# Patient Record
Sex: FEMALE | Race: WHITE
[De-identification: names, ages, dates, MRNs, and addresses within clinical notes are randomized per-mention and may not be internally consistent; named-entity substitution may affect disease eponyms.]

---

## 2017-01-11 ENCOUNTER — HOSPITAL ENCOUNTER (OUTPATIENT)
Dept: HOSPITAL 47 - MMGSC | Age: 27
Discharge: HOME | End: 2017-01-11
Payer: COMMERCIAL

## 2017-01-11 DIAGNOSIS — J02.9: Primary | ICD-10-CM

## 2017-01-11 PROCEDURE — 87070 CULTURE OTHR SPECIMN AEROBIC: CPT

## 2017-05-22 ENCOUNTER — HOSPITAL ENCOUNTER (OUTPATIENT)
Dept: HOSPITAL 47 - MMGSC | Age: 27
End: 2017-05-22
Payer: COMMERCIAL

## 2017-05-22 DIAGNOSIS — J02.9: Primary | ICD-10-CM

## 2017-05-22 PROCEDURE — 87070 CULTURE OTHR SPECIMN AEROBIC: CPT

## 2018-01-05 ENCOUNTER — HOSPITAL ENCOUNTER (OUTPATIENT)
Dept: HOSPITAL 47 - LABPAT | Age: 28
Discharge: HOME | End: 2018-01-05
Attending: OTOLARYNGOLOGY
Payer: COMMERCIAL

## 2018-01-05 DIAGNOSIS — J35.01: ICD-10-CM

## 2018-01-05 DIAGNOSIS — Z01.812: Primary | ICD-10-CM

## 2018-01-05 LAB
APTT BLD: 25.8 SEC (ref 22–30)
ERYTHROCYTE [DISTWIDTH] IN BLOOD BY AUTOMATED COUNT: 5.06 M/UL (ref 3.8–5.4)
ERYTHROCYTE [DISTWIDTH] IN BLOOD: 12.4 % (ref 11.5–15.5)
HCT VFR BLD AUTO: 43.8 % (ref 34–46)
HGB BLD-MCNC: 14.3 GM/DL (ref 11.4–16)
INR PPP: 1 (ref ?–1.2)
MCH RBC QN AUTO: 28.3 PG (ref 25–35)
MCHC RBC AUTO-ENTMCNC: 32.7 G/DL (ref 31–37)
MCV RBC AUTO: 86.6 FL (ref 80–100)
PLATELET # BLD AUTO: 309 K/UL (ref 150–450)
PT BLD: 10.3 SEC (ref 9–12)
WBC # BLD AUTO: 9.5 K/UL (ref 3.8–10.6)

## 2018-01-05 PROCEDURE — 85027 COMPLETE CBC AUTOMATED: CPT

## 2018-01-05 PROCEDURE — 85610 PROTHROMBIN TIME: CPT

## 2018-01-05 PROCEDURE — 85730 THROMBOPLASTIN TIME PARTIAL: CPT

## 2018-01-05 PROCEDURE — 36415 COLL VENOUS BLD VENIPUNCTURE: CPT

## 2018-01-12 ENCOUNTER — HOSPITAL ENCOUNTER (OUTPATIENT)
Dept: HOSPITAL 47 - OR | Age: 28
Discharge: HOME | End: 2018-01-12
Attending: OTOLARYNGOLOGY
Payer: COMMERCIAL

## 2018-01-12 VITALS — HEART RATE: 70 BPM | DIASTOLIC BLOOD PRESSURE: 70 MMHG | SYSTOLIC BLOOD PRESSURE: 119 MMHG

## 2018-01-12 VITALS — BODY MASS INDEX: 47.9 KG/M2

## 2018-01-12 VITALS — TEMPERATURE: 97.2 F

## 2018-01-12 VITALS — RESPIRATION RATE: 16 BRPM

## 2018-01-12 DIAGNOSIS — Z79.899: ICD-10-CM

## 2018-01-12 DIAGNOSIS — J35.01: Primary | ICD-10-CM

## 2018-01-12 DIAGNOSIS — J45.909: ICD-10-CM

## 2018-01-12 PROCEDURE — 88304 TISSUE EXAM BY PATHOLOGIST: CPT

## 2018-01-12 PROCEDURE — 42826 REMOVAL OF TONSILS: CPT

## 2018-01-12 PROCEDURE — 81025 URINE PREGNANCY TEST: CPT

## 2018-01-12 NOTE — HP
HISTORY AND PHYSICAL



CHIEF COMPLAINT:

Recurrent tonsillitis.



HISTORY OF PRESENT ILLNESS:

The patient is a pleasant 27-year-old female who was recently seen in my office with

complaints of having recurrent episodes of tonsillitis despite treatment with various

types of oral antibiotics.  At the time that the patient was seen in my office,

clinical examination of the oropharynx revealed 4+ cryptic tonsils filled with white

cheesy debris.  It was recommended that the patient undergo a tonsillectomy under

general anesthesia.



PAST MEDICAL HISTORY:

Past medical history reveals patient has no known allergies to medications.  She has

not had any previous surgeries.  She is not currently on any medications.  There is no

history of asthma, diabetes mellitus or hypertension.



REVIEW OF SYSTEMS:

Completely unremarkable.



PHYSICAL EXAMINATION:

This patient is a pleasant 27-year-old female who is alert and cooperative.

HEENT EXAMINATION: The patient is normocephalic. Tympanic membranes are normal.  Middle

ear spaces are free of any fluid or infection.  Pupils are equal, round and react to

light and accommodation.  Extraocular movements within normal limits.  Intranasal

examination reveals moderate septal deviation with compensatory hypertrophy of the

inferior turbinates and a moderate amount of mucus on the mucous membranes and draining

down the posterior pharynx.  Examination of oropharynx reveals 4+ tonsillar hypertrophy

with very prominent tonsillar crypts filled with white cheesy debris.  Palpation of the

neck, cranial nerves 2 to 12 and the remainder of the head and neck exam are within

normal limits.

CHEST/CARDIOVASCULAR: Both lung fields are clear to percussion and auscultation.  The

patient is in regular sinus rhythm. S1, S2 are present without evidence of any murmurs,

S3s or S4s.  Peripheral pulses are bilaterally symmetrical and within normal limits.

ABDOMEN: There is no evidence of any masses, megaly, or tenderness.  The abdomen is

soft.

Skin is unremarkable.

Musculoskeletal and neurological are within normal limits.



The remainder of physical exam is essentially unremarkable.



IMPRESSION:

Chronic tonsillitis.



PLAN:

The patient is scheduled to undergo a tonsillectomy under general anesthesia in the

a.m.



ATTENTION RNS IN THE PRE-SURGICAL AREA:  The only medications that I have ordered for

this patient to receive preoperatively are 2 grams of Ancef IV and 1000 mg of Ofirmev

IV, both to be given once an intravenous line has been established.  If the pharmacy

department sends any other medication, such as pre-surgical prophylactic antibiotics

then that order should be cancelled and the medication returned to Pharmacy and make

sure that the patient's account is credited appropriately.  The only antibiotic that I

have ordered is Ancef IV.



I have discussed the risks, benefits and alternative therapies for the above-mentioned

procedure and for both sedation/analgesia as well as necessary blood product

administration, if indicated, as they pertain to this patient.  The patient has

indicated his or her understanding and acceptance of the risks and procedures

discussed.





MMAMERICAL / MELLISSAN: [id number] / Job#: [id number]

## 2018-01-14 NOTE — OP
OPERATIVE REPORT



DATE OF SURGERY:

January 12, 20.



PREOPERATIVE DIAGNOSIS:

Chronic tonsillitis.



POSTOPERATIVE DIAGNOSIS:

Chronic tonsillitis.



ANESTHESIA:

General.



OPERATIVE PROCEDURE:

Tonsillectomy.



OPERATING SURGEON:

Dr. Matos.



COMPLICATIONS:

None.



ESTIMATED BLOOD LOSS:

Less than 50 mL.



OPERATIVE PROCEDURE:

The patient was placed on the Operating Table in the supine position, after uneventful

induction and endotracheal intubation, satisfactory general anesthesia was obtained.

Next, the #3 Maya-David mouth gag was introduced into the oropharynx, expanded and

suspended from a Kumar Stand.  Following this, both peritonsillar areas were injected

with the tonsillar forceps and pulled medially.  The sickle knife was used to make an

incision 4 mm lateral to the anterior pillar, beginning at the superior pole and

working down to the inferior pole with a similar incision being carried out parallel to

the posterior pillar.  The angle scissors and the serrated Neetu dissector were used to

dissect the tonsil away from the tonsillar fossa.  The tonsil itself was excised en

toto using the tonsillar snare.  Hemostasis was obtained using suction cautery.  A

sponge was placed in the empty tonsillar fossa.  Attention was then directed to the

left tonsil where the same procedure was carried out, with the left tonsil being

grasped with the  tonsillar forceps and pulled medially.  The sickle knife was used to

make an incision 4 mm lateral to the anterior pillar beginning at the superior pole and

working down to the inferior pole with a similar incision being carried out parallel to

the posterior pillar.  Once again, the angle scissors and the serrated Neetu dissector

were used to dissect the tonsil away from the tonsillar fossa and the tonsil itself was

excised en toto using the tonsillar snare.  Hemostasis was obtained using suction

cautery.  A sponge was placed in the empty tonsillar fossa.  The mouth gag was relaxed

for a period of approximately seven minutes and upon re-expanding and removing all

sponges, no evidence of any active bleeding was noted.  At this point, the procedure

was terminated.  There were no intraoperative complications.  The patient tolerated the

procedure well and was returned to the Recovery Room in satisfactory condition.





MMODL / IJN: [id number] / Job#: [id number]

## 2018-06-11 ENCOUNTER — HOSPITAL ENCOUNTER (OUTPATIENT)
Dept: HOSPITAL 47 - RADXRMAIN | Age: 28
Discharge: HOME | End: 2018-06-11
Payer: COMMERCIAL

## 2018-06-11 DIAGNOSIS — M54.5: Primary | ICD-10-CM

## 2018-06-11 PROCEDURE — 72110 X-RAY EXAM L-2 SPINE 4/>VWS: CPT

## 2018-06-11 NOTE — XR
EXAM TYPE: LUMBAR SPINE X RAY SERIES

 

COMPARISON: NONE

 

HISTORY: Low back pain

 

TECHNIQUE: 4 views are submitted.

 

FINDINGS:

Alignment is anatomic.  The pedicles are intact.  The transverse processes are intact.  There is no s
pondylolysis or spondylolisthesis.  

 

IMPRESSION:

1. No acute process.  If symptoms persist consider MRI.

## 2021-03-25 ENCOUNTER — HOSPITAL ENCOUNTER (EMERGENCY)
Dept: HOSPITAL 47 - EC | Age: 31
Discharge: HOME | End: 2021-03-25
Payer: COMMERCIAL

## 2021-03-25 VITALS — DIASTOLIC BLOOD PRESSURE: 73 MMHG | TEMPERATURE: 98 F | HEART RATE: 77 BPM | SYSTOLIC BLOOD PRESSURE: 122 MMHG

## 2021-03-25 VITALS — RESPIRATION RATE: 18 BRPM

## 2021-03-25 DIAGNOSIS — U07.1: Primary | ICD-10-CM

## 2021-03-25 DIAGNOSIS — J45.909: ICD-10-CM

## 2021-03-25 PROCEDURE — 71045 X-RAY EXAM CHEST 1 VIEW: CPT

## 2021-03-25 PROCEDURE — 96365 THER/PROPH/DIAG IV INF INIT: CPT

## 2021-03-25 PROCEDURE — 99285 EMERGENCY DEPT VISIT HI MDM: CPT

## 2021-03-25 PROCEDURE — 99284 EMERGENCY DEPT VISIT MOD MDM: CPT

## 2021-03-25 NOTE — ED
URI HPI





- General


Chief Complaint: Upper Respiratory Infection


Stated Complaint: COVID+, SOB


Time Seen by Provider: 03/25/21 10:53


Source: patient


Mode of arrival: ambulatory


Limitations: no limitations





- History of Present Illness


Initial Comments: 


31yo female presenting for cc of covid positive with shortness of 

breath--patient states that since 3/18 she has had cough, body ahces, fevers. 

She tested positive for covid on Sunday. Patient states that she continues to 

feel short of breath and have persistent cough.  Patient denies leg swelling 

calf pain hemoptysis she denies vomiting diarrhea pregnancy. Patient denies 

abdominal pain, chest pain. patient on arrival appears well nontoxic in no acute

distress. Pt BMI 53








- Related Data


                                Home Medications











 Medication  Instructions  Recorded  Confirmed


 


No Known Home Medications  03/25/21 03/25/21











                                    Allergies











Allergy/AdvReac Type Severity Reaction Status Date / Time


 


No Known Allergies Allergy   Verified 03/25/21 12:00














Review of Systems


ROS Statement: 


Those systems with pertinent positive or pertinent negative responses have been 

documented in the HPI.





ROS Other: All systems not noted in ROS Statement are negative.





Past Medical History


Past Medical History: Asthma


History of Any Multi-Drug Resistant Organisms: None Reported


Past Surgical History: Tonsillectomy


Past Psychological History: No Psychological Hx Reported


Smoking Status: Never smoker


Past Alcohol Use History: Occasional


Past Drug Use History: None Reported





General Exam





- General Exam Comments


Initial Comments: 


General:  The patient is awake and alert, in no distress


Eye:  +3 mm pupils are equal, round and reactive to light, extra-ocular 

movements are intact.  No nystagmus.  There is normal conjunctiva bilaterally.  

No signs of icterus.  


Ears, nose, mouth and throat:  There are moist mucous membranes and no oral 

lesions. 


Neck:  The neck is supple, there is no tenderness or JVD.  


Cardiovascular:  There is a regular rate and rhythm. No murmur, rub or gallop is

appreciated.


Respiratory:  Lungs are clear to auscultation, respirations are non-labored, 

breath sounds are equal.  No wheezes, stridor, rales, or rhonchi.


Gastrointestinal:  Soft, non-distended, non-tender abdomen without masses or 

organomegaly noted. There is no rebound or guarding present. 


Musculoskeletal:  Normal ROM, no tenderness.  Strength 5/5. Sensation intact. 

Radial and DP pulses equal bilaterally 2+.  


Neurological:  A&O x 3. CN II-XII intact grossly, There are no obvious motor or 

sensory deficits. Coordination appears grossly intact. Speech is normal.


Skin:  Skin is warm and dry and no rashes or lesions are noted. 


Psychiatric:  Cooperative, appropriate mood & affect, normal judgment.  





Limitations: no limitations





Course


                                   Vital Signs











  03/25/21 03/25/21 03/25/21





  10:45 12:36 14:18


 


Temperature 98.9 F  98.0 F


 


Pulse Rate 111 H  77


 


Respiratory 18 18 18





Rate   


 


Blood Pressure 112/74  122/73


 


O2 Sat by Pulse 100  99





Oximetry   














Medical Decision Making





- Medical Decision Making


31yo Covid +. WIth dyspnea. APpears in no distress. she is not hypoxic. Pt is 

obese with BMI 53 making her high risk. Discussed monoclonal antibodies patient 

would like to proceed with this treatment after reviewing risk vs benefit and 

patient reading through the information packet. Patient CXR revealed bronchitis 

vs pneumonitis. Patient advised on return parameters and was discharged after 

being monitored for adverse reaction to BAM. Patient agreeable to 

discharge/return parameters,








Disposition


Clinical Impression: 


 COVID-19, Dyspnea, Bronchitis





Disposition: HOME SELF-CARE


Condition: Good


Instructions (If sedation given, give patient instructions):  Coronavirus 

Disease 2019 (COVID-19), Upper Respiratory Infection (ED)


Additional Instructions: 


Please use medication as discussed.  Please follow-up with family doctor in the 

next 2 days, watch oxygen levels at home and return for worsening 

symptoms/shortness of breath. Please return to emergency room if the symptoms 

increase or worsen or for any other concerns.


Is patient prescribed a controlled substance at d/c from ED?: No


Referrals: 


Natasha Fernández MD [Primary Care Provider] - 1-2 days


Time of Disposition: 13:35

## 2021-03-25 NOTE — XR
EXAMINATION TYPE: XR chest 1V portable

 

DATE OF EXAM: 3/25/2021

 

COMPARISON: NONE

 

HISTORY: Shortness of breath

 

TECHNIQUE: Single frontal view of the chest is obtained.

 

FINDINGS:  There is no focal air space opacity, pleural effusion, or pneumothorax seen.  The cardiac 
silhouette size is within normal limits.   The osseous structures are intact. Coarsened interstitium.


 

IMPRESSION:  Correlate for mild bronchitis or interstitial pneumonitis.

## 2021-12-19 ENCOUNTER — HOSPITAL ENCOUNTER (EMERGENCY)
Dept: HOSPITAL 47 - EC | Age: 31
Discharge: HOME | End: 2021-12-19
Payer: COMMERCIAL

## 2021-12-19 VITALS — RESPIRATION RATE: 16 BRPM | TEMPERATURE: 98.5 F

## 2021-12-19 VITALS — DIASTOLIC BLOOD PRESSURE: 52 MMHG | SYSTOLIC BLOOD PRESSURE: 99 MMHG | HEART RATE: 73 BPM

## 2021-12-19 DIAGNOSIS — J45.909: ICD-10-CM

## 2021-12-19 DIAGNOSIS — R51.9: Primary | ICD-10-CM

## 2021-12-19 PROCEDURE — 96375 TX/PRO/DX INJ NEW DRUG ADDON: CPT

## 2021-12-19 PROCEDURE — 96361 HYDRATE IV INFUSION ADD-ON: CPT

## 2021-12-19 PROCEDURE — 96374 THER/PROPH/DIAG INJ IV PUSH: CPT

## 2021-12-19 PROCEDURE — 81025 URINE PREGNANCY TEST: CPT

## 2021-12-19 PROCEDURE — 99284 EMERGENCY DEPT VISIT MOD MDM: CPT

## 2021-12-19 NOTE — ED
Headache HPI





- General


Chief Complaint: Headache


Stated Complaint: Headache


Time Seen by Provider: 12/19/21 16:59


Mode of arrival: ambulatory


Limitations: no limitations





- History of Present Illness


Initial Comments: 


31 year-old female patient presents for evaluation of persistent headache. 

States it started about 14 days ago. States it was improving with over the 

counter migraine relief medication but never completely went away. States that 

now the medication is not working at all. States it is mostly posterior at the 

base of her skull. She reports light sensitivity and slight sound sensitivity. 

Denies vomiting. States she feels like her vision is diminished in her right 

eye. Denies any head injury. Denies fever or chills. Denies any recent illness. 

States she had migraines in middle school but nothing other than normal 

headaches intermittently since. Reports possibility of pregnancy. Did have 

negative home pregnancy test this morning. 





- Related Data


                                Home Medications











 Medication  Instructions  Recorded  Confirmed


 


Acetaminophen [Tylenol] 1,000 mg PO Q4-6H PRN 12/19/21 12/19/21


 


Albuterol Nebulized [Ventolin 2.5 mg INHALATION RT-Q6H PRN 12/19/21 12/19/21





Nebulized]   


 


Beclomethasone Dipropionate [Qvar 1 puff INHALATION RT-BID PRN 12/19/21 12/19/21





40 mcg Redihaler]   











                                    Allergies











Allergy/AdvReac Type Severity Reaction Status Date / Time


 


No Known Allergies Allergy   Verified 12/19/21 17:43














Review of Systems


ROS Statement: 


Those systems with pertinent positive or pertinent negative responses have been 

documented in the HPI.





ROS Other: All systems not noted in ROS Statement are negative.





Past Medical History


Past Medical History: Asthma


History of Any Multi-Drug Resistant Organisms: None Reported


Past Surgical History: Tonsillectomy


Past Psychological History: No Psychological Hx Reported


Smoking Status: Never smoker


Past Alcohol Use History: Occasional


Past Drug Use History: None Reported





General Exam


Limitations: no limitations


General appearance: alert, in no apparent distress, other (This is a well-

developed, well-nourished adult female patient in no acute distress.)


Eye exam: Present: normal appearance, PERRL, EOMI.  Absent: scleral icterus, 

conjunctival injection, nystagmus, periorbital swelling


ENT exam: Present: normal exam, normal oropharynx, mucous membranes moist


Respiratory exam: Present: normal lung sounds bilaterally.  Absent: respiratory 

distress, wheezes, rales, rhonchi, stridor


Cardiovascular Exam: Present: regular rate, normal rhythm, normal heart sounds. 

Absent: systolic murmur, diastolic murmur, rubs, gallop, clicks


Neurological exam: Present: alert, oriented X3, CN II-XII intact


  ** Expanded


Speech: Present: fluid speech


Cranial nerves: EOM's Intact: Normal, Nystagmus: Normal


Motor strength exam: RUE: 5, LUE: 5, RLE: 5, LLE: 5


Psychiatric exam: Present: normal affect, normal mood


Skin exam: Present: warm, dry, intact, normal color.  Absent: rash





Course


                                   Vital Signs











  12/19/21 12/19/21





  16:35 20:10


 


Temperature 98.5 F 


 


Pulse Rate 90 73


 


Respiratory 16 16





Rate  


 


Blood Pressure 128/74 99/52


 


O2 Sat by Pulse 100 99





Oximetry  














Medical Decision Making





- Medical Decision Making


31-year-old female patient presents to the emergency department today for evalu

ation of headache that has been going on for the last 14 days.  Physical 

examination is unremarkable.  She is neurologically intact with no focal 

deficits.  Vital signs are unremarkable.  She was given IV fluids and pain 

medication.  Upon reevaluation she states her pet headache is much improved.  

She'll be given a dose of Decadron. I did offer to perform CT of the brain for 

further evaluation due to longevity of the headache. She declined at this time 

and will follow up with her primary care physician. She is instructed to follow-

up with her primary care physician for recheck 1-2 days.  Return parameters were

discussed in detail.  She verbalizes understanding and agrees with this plan.  

My attending is Dr. Huang.








- Lab Data


                                   Lab Results











  12/19/21 Range/Units





  17:49 


 


Urine HCG, Qual  Not Detected  (Not Detectd)  














Disposition


Clinical Impression: 


 Headache





Disposition: HOME SELF-CARE


Condition: Good


Instructions (If sedation given, give patient instructions):  Acute Headache 

(ED)


Additional Instructions: 


Follow up with her primary care physician for recheck in 1-2 days.  Return to 

the emergency department for any new, worsening, or concerning symptoms.


Is patient prescribed a controlled substance at d/c from ED?: No


Referrals: 


Natasha Fernández MD [Primary Care Provider] - 1-2 days


Time of Disposition: 19:26

## 2021-12-23 ENCOUNTER — HOSPITAL ENCOUNTER (EMERGENCY)
Dept: HOSPITAL 47 - EC | Age: 31
Discharge: HOME | End: 2021-12-23
Payer: COMMERCIAL

## 2021-12-23 VITALS — RESPIRATION RATE: 16 BRPM | HEART RATE: 87 BPM

## 2021-12-23 VITALS — SYSTOLIC BLOOD PRESSURE: 159 MMHG | TEMPERATURE: 98.7 F | DIASTOLIC BLOOD PRESSURE: 87 MMHG

## 2021-12-23 DIAGNOSIS — E66.9: ICD-10-CM

## 2021-12-23 DIAGNOSIS — G43.909: Primary | ICD-10-CM

## 2021-12-23 DIAGNOSIS — J45.909: ICD-10-CM

## 2021-12-23 DIAGNOSIS — Z79.51: ICD-10-CM

## 2021-12-23 PROCEDURE — 70450 CT HEAD/BRAIN W/O DYE: CPT

## 2021-12-23 PROCEDURE — 96361 HYDRATE IV INFUSION ADD-ON: CPT

## 2021-12-23 PROCEDURE — 96375 TX/PRO/DX INJ NEW DRUG ADDON: CPT

## 2021-12-23 PROCEDURE — 99283 EMERGENCY DEPT VISIT LOW MDM: CPT

## 2021-12-23 PROCEDURE — 96374 THER/PROPH/DIAG INJ IV PUSH: CPT

## 2021-12-23 NOTE — ED
Headache HPI





- General


Chief Complaint: Headache


Stated Complaint: Headache


Time Seen by Provider: 12/23/21 21:06


Source: RN notes reviewed


Mode of arrival: ambulatory


Limitations: no limitations





- History of Present Illness


Initial Comments: 





Patient is a 31-year-old female that presents to the emergency department 

complaining of a migraine type headache.  She notes that this is new onset for 

her approximately 2 days ago.  She notes she came to the emergency Department so

days ago with the same complaint and was sent home with medications.  She notes 

that she does have a family history of migraines.  She notes that she does have 

photophobia.  She denied any other symptoms or complaints.  She was otherwise 

well-appearing.  She denied chest pain shortness of breath nausea vomiting 

diarrhea constipation fever fatigue chills.





- Related Data


                                Home Medications











 Medication  Instructions  Recorded  Confirmed


 


Acetaminophen [Tylenol] 1,000 mg PO Q4-6H PRN 12/19/21 12/23/21


 


Albuterol Nebulized [Ventolin 2.5 mg INHALATION RT-Q6H PRN 12/19/21 12/23/21





Nebulized]   


 


Beclomethasone Dipropionate [Qvar 1 puff INHALATION RT-BID PRN 12/19/21 12/23/21





40 mcg Redihaler]   











                                    Allergies











Allergy/AdvReac Type Severity Reaction Status Date / Time


 


No Known Allergies Allergy   Verified 12/23/21 21:47














Review of Systems


ROS Statement: 


Those systems with pertinent positive or pertinent negative responses have been 

documented in the HPI.





ROS Other: All systems not noted in ROS Statement are negative.





Past Medical History


Past Medical History: Asthma


History of Any Multi-Drug Resistant Organisms: None Reported


Past Surgical History: Tonsillectomy


Past Psychological History: No Psychological Hx Reported


Smoking Status: Never smoker


Past Alcohol Use History: Occasional


Past Drug Use History: None Reported





General Exam


Limitations: no limitations


General appearance: alert, in no apparent distress, obese


Head exam: Present: atraumatic, normocephalic, normal inspection


Eye exam: Present: normal appearance, PERRL, EOMI.  Absent: scleral icterus, 

conjunctival injection, periorbital swelling


ENT exam: Present: normal exam, mucous membranes moist


Neck exam: Present: normal inspection.  Absent: tenderness, meningismus, 

lymphadenopathy


Respiratory exam: Present: normal lung sounds bilaterally.  Absent: respiratory 

distress, wheezes, rales, rhonchi, stridor


Cardiovascular Exam: Present: regular rate, normal rhythm, normal heart sounds. 

Absent: systolic murmur, diastolic murmur, rubs, gallop, clicks


Extremities exam: Present: normal inspection, full ROM, normal capillary refill.

 Absent: tenderness, pedal edema, joint swelling, calf tenderness


Neurological exam: Present: alert, oriented X3


Psychiatric exam: Present: normal affect, normal mood





Course





                                   Vital Signs











  12/23/21





  20:51


 


Temperature 98.7 F


 


Pulse Rate 98


 


Respiratory 18





Rate 


 


Blood Pressure 159/87


 


O2 Sat by Pulse 99





Oximetry 














Medical Decision Making





- Medical Decision Making





31-year-old female with a migraine type headache for the past several days.


50 mg of Benadryl, 4 mg Zofran, 15 g of Toradol, 1 L normal saline, CT of the 

brain ordered.


CT of the brain negative for any acute process.


case discussed with Dr. Jason, patient can discharge home.





- Radiology Data


Radiology results: report reviewed, image reviewed





CT of the brain: Negative unenhanced head computed tomography scan.  Minimal 

mucosal thickening in the posterior maxillary sinus noted.





Disposition


Clinical Impression: 


 Migraine headache





Disposition: HOME SELF-CARE


Condition: Stable


Instructions (If sedation given, give patient instructions):  Acute Headache 

(ED)


Additional Instructions: 


Please return to the Emergency Department if symptoms worsen or any other 

concerns.


Follow-up with primary care 1-2 days.


Take medication as prescribed.


Follow-up with neurologist as planned.





Is patient prescribed a controlled substance at d/c from ED?: No


Referrals: 


Natasha Fernández MD [Primary Care Provider] - 1-2 days


Time of Disposition: 23:25

## 2021-12-23 NOTE — CT
EXAMINATION TYPE: CT brain wo con

 

DATE OF EXAM: 12/23/2021

 

COMPARISON: None

 

HISTORY: Headache

 

CT DLP: 1071.4 mGycm

Automated exposure control for dose reduction was used.

 

Ventricles have normal size. There is no mass effect nor midline shift. There is no sign of intracran
ial hemorrhage. The calvarium is intact. There is no sign of cerebral edema. Skull base is intact. Th
ere is normal aeration of the mastoid sinuses.

 

IMPRESSION:

Negative unenhanced head CT scan. Minimal mucosal thickening in the posterior maxillary sinuses noted
.

## 2022-01-13 ENCOUNTER — HOSPITAL ENCOUNTER (OUTPATIENT)
Dept: HOSPITAL 47 - RADCTMAIN | Age: 32
Discharge: HOME | End: 2022-01-13
Attending: OTOLARYNGOLOGY
Payer: COMMERCIAL

## 2022-01-13 DIAGNOSIS — K11.8: Primary | ICD-10-CM

## 2022-01-13 DIAGNOSIS — J32.0: ICD-10-CM

## 2022-01-13 DIAGNOSIS — J32.2: ICD-10-CM

## 2022-01-13 PROCEDURE — 70491 CT SOFT TISSUE NECK W/DYE: CPT

## 2022-01-13 NOTE — CT
EXAMINATION TYPE: CT soft tissue neck w con

 

DATE OF EXAM: 1/13/2022

 

COMPARISON: None

 

HISTORY: 31-year-old female R22.1, Localized swelling, mass/lump RT side EAM/neck. Marked by TARI.

 

TECHNIQUE: Contiguous axial scanning of the soft tissues of the neck performed with IV Contrast, shubham
ent injected with 100 mL of Isovue 300. Coronal/sagittal reconstructions performed.

 

CT DLP: 1091.10 mGycm

Automated exposure control for dose reduction was used.

 

FINDINGS: 

There are direct takeoff of the left vertebral artery directly from the aortic arch.

 

The thyroid and submandibular glands are satisfactory. The left parotid gland is satisfactory.

 

There is a lobulated mass of the superficial lobe right parotid gland measuring 3.0 x 2.5 x 2.1 cm (s
agittal image 23 and axial image 77). Overlying palpable marker.

 

Prominent lymph node overlying the left side of the jaw just superficial to the lower left masseter m
uscle measuring up to 6 mm short axis. Nonspecific, probably reactive.

 

No cervical lymphadenopathy by CT size criteria.

 

Visualized intracranial structures show no gross abnormal body. Moderate lobulated mucosal thickening
 floor of the axillary sinuses and moderate mucosal thickening ethmoid air cells. Visualized orbits a
nd globes are intact. Mastoid air cells clear.

 

Soft tissue fullness in the region of the nasopharynx may relate to some layering secretions, axial i
mage 75. There is also prominent soft tissue fullness in the upper oropharynx. There is complete effa
cement of the airway here. This may reflect prominent bilateral palatine tonsillar hypertrophy and ca
n be correlated with direct visualization.

 

Breathing motion artifact. Some asymmetric hypertrophy of the left lingual tonsils partially effacing
 the left vallecular space.

 

Epiglottis and prevertebral soft tissues as well as the tracheal column and visualized upper lungs ap
pear clear.

 

Reversal of the normal cervical lordosis.

 

 

IMPRESSION: 

 

1. PALPABLE MARKER ON THE RIGHT WITH UNDERLYING LOBULATED 3.0 X 2.5 X 2.1 CM SOLID MASS OF THE SUPERF
ICIAL LOBE RIGHT PAROTID GLAND. 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
R SALIVARY GLAND TUMOR. RECOMMEND ULTRASOUND AND CONSIDERATION TO TISSUE SAMPLING.

2. CROWDING IN THE NASOPHARYNX AND ESPECIALLY THE UPPER OROPHARYNX. FINDINGS PROBABLY DUE TO PROMINEN
T LYMPHOID TISSUE ESPECIALLY AT THE BILATERAL PALATINE TONSILS. RECOMMEND DIRECT VISUALIZATION TO EXC
LUDE ANY MUCOSAL LESION.

3. MODERATE CHRONIC ETHMOID SINUS DISEASE AND ALSO ALONG THE FLOORS OF THE MAXILLARY SINUSES.

## 2022-01-28 ENCOUNTER — HOSPITAL ENCOUNTER (OUTPATIENT)
Dept: HOSPITAL 47 - RADECHMAIN | Age: 32
Discharge: HOME | End: 2022-01-28
Attending: FAMILY MEDICINE
Payer: COMMERCIAL

## 2022-01-28 DIAGNOSIS — I49.3: Primary | ICD-10-CM

## 2022-01-28 PROCEDURE — 93225 XTRNL ECG REC<48 HRS REC: CPT

## 2022-01-28 PROCEDURE — 93226 XTRNL ECG REC<48 HR SCAN A/R: CPT

## 2022-02-03 NOTE — HM
HOLTER MONITOR REPORT



TWENTY-FOUR-HOUR HOLTER:



INDICATION:

Palpitations.



This 24-hour Holter shows sinus rhythm with episodes of sinus tachycardia. The maximum

heart rate was 170 beats per minute.  Occasional PVCs are noted.  Rare PACs are noted.

There are no episodes of sustained ventricular or supraventricular tachyarrhythmias.

There are no episodes of more than 2-second pauses.  Patient's symptoms were not

associated with significant Holter abnormalities.



CONCLUSIONS:

This 24-hour Holter revealed sinus rhythm with PVCs.





MMODL / IJN: [id number] / Job#: [id number]

## 2022-02-18 ENCOUNTER — HOSPITAL ENCOUNTER (OUTPATIENT)
Dept: HOSPITAL 47 - OR | Age: 32
Discharge: HOME | End: 2022-02-18
Attending: OTOLARYNGOLOGY
Payer: COMMERCIAL

## 2022-02-18 VITALS — SYSTOLIC BLOOD PRESSURE: 95 MMHG | DIASTOLIC BLOOD PRESSURE: 61 MMHG | HEART RATE: 78 BPM

## 2022-02-18 VITALS — RESPIRATION RATE: 20 BRPM

## 2022-02-18 VITALS — TEMPERATURE: 97.7 F

## 2022-02-18 VITALS — BODY MASS INDEX: 52.4 KG/M2

## 2022-02-18 DIAGNOSIS — G43.909: ICD-10-CM

## 2022-02-18 DIAGNOSIS — Z79.51: ICD-10-CM

## 2022-02-18 DIAGNOSIS — J45.909: ICD-10-CM

## 2022-02-18 DIAGNOSIS — Z79.899: ICD-10-CM

## 2022-02-18 DIAGNOSIS — J39.2: Primary | ICD-10-CM

## 2022-02-18 DIAGNOSIS — K21.9: ICD-10-CM

## 2022-02-18 PROCEDURE — 81025 URINE PREGNANCY TEST: CPT

## 2022-02-18 PROCEDURE — 31526 DX LARYNGOSCOPY W/OPER SCOPE: CPT

## 2022-02-21 NOTE — OP
OPERATIVE REPORT



DATE OF SURGERY:

02/18/2022



PREOPERATIVE DIAGNOSIS:

Nasopharyngeal mass.



POSTOPERATIVE DIAGNOSIS:

Nasopharyngeal mass.



ANESTHESIA:

General.



OPERATIVE PROCEDURE:

1. Suspension microlaryngoscopy.

2. Examination of nasopharynx under anesthesia.



OPERATING SURGEON:

Dr. Salcido.



COMPLICATIONS:

None.



ESTIMATED BLOOD LOSS:

Zero.



OPERATIVE PROCEDURE DESCRIPTION:

The patient was placed on the operating table in supine position, and after uneventful

induction and endotracheal intubation, satisfactory general anesthesia was obtained.

Attention was directed initially to performing the suspension microlaryngoscopy portion

of the procedure.  Therefore the laryngoscope was introduced into the patient's

oropharynx and the right and left piriform sinuses, valleculae, base of tongue and

epiglottis were inspected and found to be free of any suspicious lesions.  Next the tip

of the laryngoscope was introduced into the laryngeal introitus. The Lewy apparatus was

attached to the handle of the laryngoscope and the laryngoscope was suspended on the

patient's chest. Next, using the Zeiss operating microscope, inspection of the larynx

revealed no suspicious lesions of the vocal cords, false vocal cords, ventricles,

arytenoids or any of the laryngeal structures.  Next the laryngoscope was removed from

the patient's oropharynx and a #3 Caterina-Fausto mouth gag was introduced into the

patient's pharynx and expanded and suspended on a Hester stand.  Next a red rubber

catheter was placed into the patient's left naris and brought out through the

oropharynx and clamped.  This was done so as to be able to elevate the soft palate.

Next, using the laryngeal mirror, inspection of the nasopharynx, including attention to

the eustachian tube orifices and fossae of Rosenmueller, was carefully done. No

suspicious lesions or masses were noted.  There was no evidence of any residual

lymphoid tissue in the nasopharynx.  The patient had previously had a tonsillectomy and

there was no evidence of any residual tonsillar tags noted.  The patient was given 10

mg of Decadron intraoperatively to reduce any laryngeal swelling.  At this point the

procedure was terminated.  The mouth gag was removed from the patient's pharynx.  The

patient tolerated the procedure well and was returned to the recovery room in

satisfactory condition.





MMODL / IJN: [id number] / Job#: [id number]

## 2022-03-03 ENCOUNTER — HOSPITAL ENCOUNTER (OUTPATIENT)
Dept: HOSPITAL 47 - SLEEP | Age: 32
End: 2022-03-03
Attending: INTERNAL MEDICINE
Payer: COMMERCIAL

## 2022-03-03 DIAGNOSIS — Z87.11: ICD-10-CM

## 2022-03-03 DIAGNOSIS — J45.909: ICD-10-CM

## 2022-03-03 DIAGNOSIS — Z86.16: ICD-10-CM

## 2022-03-03 DIAGNOSIS — K21.9: ICD-10-CM

## 2022-03-03 DIAGNOSIS — R06.83: Primary | ICD-10-CM

## 2022-03-03 DIAGNOSIS — E66.01: ICD-10-CM

## 2022-03-03 DIAGNOSIS — R51.9: ICD-10-CM

## 2022-03-03 DIAGNOSIS — D49.9: ICD-10-CM

## 2022-03-03 DIAGNOSIS — Z79.899: ICD-10-CM

## 2022-03-03 PROCEDURE — 99211 OFF/OP EST MAY X REQ PHY/QHP: CPT

## 2022-03-03 NOTE — CONS
CONSULTATION



DATE OF SERVICE:

03/03/2022



This 31-year-old lady has [facility] for possible obstructive sleep

apnea-hypopnea syndrome.



HISTORY OF PRESENT ILLNESS/SLEEP-WAKE EVALUATION:

This patient was diagnosed with obstructive sleep apnea about 10 years ago in another

institution.  At that time it was recommended that she start treatment with CPAP, but

the patient was not able to use the treatment.



Currently her sleep schedule is from 10 or 11 p.m. to 7 a.m. on weekdays and on

weekends from 11 p.m. or midnight until between 7 and 8:30 a.m. Sometimes the patient

has problems with falling asleep, although no TV in bedroom.  She sleeps in different

positions with snoring and awakenings from sleep up to 4 times with one episode of

nocturia. Positive history of grinding teeth.



No history of hypnagogic hallucinations, sleep paralysis or cataplexy.



In the morning the patient wakes up tired, has problems with concentration and

irritability.  [place] Sleepiness Scale increased to 12.  The patient does not take any

naps, though.



Over 5 years her weight has increased by about 70 pounds.



PAST MEDICAL HISTORY:

Positive for asthma, seasonal allergies, rhinitis, headaches, acid reflux, peptic ulcer

disease, COVID-19 in June 2021. Tumor on the right side of the face, under evaluation.



PAST SURGICAL HISTORY:

Exploratory throat surgery in January of 2022.



MEDICATIONS:

Albuterol, Qvar.



SOCIAL HISTORY:

Negative for smoking. Alcohol consumption occasional.



FAMILY HISTORY:

Hypertension, arthritis, cancer, diabetes, thyroid problems.



REVIEW OF SYSTEMS:

Snoring, multiple awakenings from sleep.



No fevers.  No double vision.  No recent chest pain.  No shortness of breath.  No

abdominal pain.  No bleeding episodes.  No blood in the urine.  No seizure episodes.



PHYSICAL EXAMINATION:

GENERAL: Pleasant  lady without distress.

VITAL SIGNS: /79, HR around 100, RR 16, height 5 feet 7-1/4 inches, weight 348.6

pounds, body mass index 54.1, temperature 98.1, oxygen saturation at room air 97%.

HEENT: PERRLA, EOMI, evaluation of oropharynx showed tongue protrudes midline. Wide

pillars. Position of soft palate in the range of Mallampati II.

NECK:  Supple, no JVD.  Thyroid is not palpable. Neck measures 17 inches in

circumference. Palpable tumor on the right side of the face, hot during palpation about

6 to 7 cm long by 2 to 3 cm.

LUNGS:  Clear to percussion and to auscultation.  Good air exchange.  No wheezing or

rhonchi.

HEART:  S1, S2 regular.  No murmurs, gallops, or rubs.

ABDOMEN:  Soft and nontender.  Bowel sounds are present.  No organomegaly appreciated.

EXTREMITIES: No clubbing or cyanosis.

CNS:  Awake, alert, and oriented X3.  Cranial nerves 2 to 7 intact.  There is no

fasciculation or atrophy. noted.  No focal deficits observed.



IMPRESSION:

1. Snoring, multiple awakenings from sleep, small oropharyngeal airspace, wide neck,

    17 inches in circumference; possible obstructive sleep apnea-hypopnea syndrome.

2. Obesity, morbid range; body mass index 54.1.

3. Asthma.

4. Tumor, right side of the face.  Hot during palpation, about 7 x 3 cm.

5. Seasonal allergies.

6. Rhinitis.

7. Headaches.

8. Acid reflux.

9. History of peptic ulcer disease.

10.Status post COVID-19 in the middle of last year.



PLAN:

1. Polysomnography for evaluation of patient's breathing during sleep and also to

    exclude obesity hypoventilation.

2. CPAP/BiPAP titration if sleep study confirms obstructive sleep apnea-hypopnea

    syndrome.

3. Preferable position during sleep on the side.

4. No driving if patient feels any sleepiness.

5. I will see patient for follow up visit to explain results of testing and following

    plan.



Thank you very much for referring this patient for consultation.



Sincerely,







Jimenez García MD, PhD, FAASM

Diplomat of [employer]

Sleep Medicine Board of [employer]

Medical Director of [facility]





MMODL / IJN: [id number] / Job#: [id number]

## 2025-01-22 ENCOUNTER — HOSPITAL ENCOUNTER (OUTPATIENT)
Dept: HOSPITAL 47 - RADUSWWP | Age: 35
Discharge: HOME | End: 2025-01-22
Attending: FAMILY MEDICINE
Payer: COMMERCIAL

## 2025-01-22 DIAGNOSIS — R19.09: Primary | ICD-10-CM

## 2025-01-22 PROCEDURE — 76705 ECHO EXAM OF ABDOMEN: CPT

## 2025-01-22 NOTE — US
EXAMINATION TYPE: US abdomen limited

 

DATE OF EXAM: 1/22/2025

 

COMPARISON: NONE

 

CLINICAL INDICATION: Female, 34 years old with history of  R19.09 OTHER INTRA-ABDOMINAL AND PELVIC SW
ELLING,; Palpable left abdomen at lower rib.  Patient states it is starting to get uncomfortable.  

 

TECHNIQUE:  Sonographic images taken of area of concern. 

 

FINDINGS:   Multiple images taken of patients palpable area of of concern.  Possible focal area seen 
= 6.8 x 1.5 cm, nonvascular.  Contralateral image taken. 

 

IMPRESSION:

Hypoechoic lesion thought to be in the subcutaneous tissues possibly representing a lipoma. This can 
be confirmed with CT or MRI with palpable marker placement.

 

[facility], Workstation: [id number], 1/22/2025 10:46 AM